# Patient Record
Sex: MALE
[De-identification: names, ages, dates, MRNs, and addresses within clinical notes are randomized per-mention and may not be internally consistent; named-entity substitution may affect disease eponyms.]

---

## 2019-07-24 ENCOUNTER — APPOINTMENT (OUTPATIENT)
Dept: GASTROENTEROLOGY | Facility: CLINIC | Age: 69
End: 2019-07-24
Payer: MEDICARE

## 2019-07-24 VITALS
WEIGHT: 216 LBS | OXYGEN SATURATION: 98 % | BODY MASS INDEX: 25.5 KG/M2 | HEART RATE: 79 BPM | RESPIRATION RATE: 14 BRPM | SYSTOLIC BLOOD PRESSURE: 124 MMHG | DIASTOLIC BLOOD PRESSURE: 76 MMHG | HEIGHT: 77 IN

## 2019-07-24 DIAGNOSIS — Z78.9 OTHER SPECIFIED HEALTH STATUS: ICD-10-CM

## 2019-07-24 DIAGNOSIS — Z86.69 PERSONAL HISTORY OF OTHER DISEASES OF THE NERVOUS SYSTEM AND SENSE ORGANS: ICD-10-CM

## 2019-07-24 DIAGNOSIS — Z12.11 ENCOUNTER FOR SCREENING FOR MALIGNANT NEOPLASM OF COLON: ICD-10-CM

## 2019-07-24 DIAGNOSIS — Z87.39 PERSONAL HISTORY OF OTHER DISEASES OF THE MUSCULOSKELETAL SYSTEM AND CONNECTIVE TISSUE: ICD-10-CM

## 2019-07-24 DIAGNOSIS — Z82.49 FAMILY HISTORY OF ISCHEMIC HEART DISEASE AND OTHER DISEASES OF THE CIRCULATORY SYSTEM: ICD-10-CM

## 2019-07-24 DIAGNOSIS — Z12.12 ENCOUNTER FOR SCREENING FOR MALIGNANT NEOPLASM OF COLON: ICD-10-CM

## 2019-07-24 PROCEDURE — 99214 OFFICE O/P EST MOD 30 MIN: CPT

## 2019-07-24 RX ORDER — ACETAZOLAMIDE 125 MG/1
125 TABLET ORAL
Refills: 0 | Status: ACTIVE | COMMUNITY

## 2019-07-24 NOTE — PHYSICAL EXAM
[General Appearance - Alert] : alert [General Appearance - In No Acute Distress] : in no acute distress [Sclera] : the sclera and conjunctiva were normal [Extraocular Movements] : extraocular movements were intact [Outer Ear] : the ears and nose were normal in appearance [Hearing Threshold Finger Rub Not Moore] : hearing was normal [Neck Appearance] : the appearance of the neck was normal [Neck Cervical Mass (___cm)] : no neck mass was observed [Auscultation Breath Sounds / Voice Sounds] : lungs were clear to auscultation bilaterally [Heart Rate And Rhythm] : heart rate was normal and rhythm regular [Heart Sounds] : normal S1 and S2 [Heart Sounds Gallop] : no gallops [Murmurs] : no murmurs [Heart Sounds Pericardial Friction Rub] : no pericardial rub [Bowel Sounds] : normal bowel sounds [Abdomen Soft] : soft [Abdomen Tenderness] : non-tender [Abdomen Mass (___ Cm)] : no abdominal mass palpated [Cervical Lymph Nodes Enlarged Posterior Bilaterally] : posterior cervical [Cervical Lymph Nodes Enlarged Anterior Bilaterally] : anterior cervical [Supraclavicular Lymph Nodes Enlarged Bilaterally] : supraclavicular [Abnormal Walk] : normal gait [] : no rash [Skin Color & Pigmentation] : normal skin color and pigmentation [Nail Clubbing] : no clubbing  or cyanosis of the fingernails [Affect] : the affect was normal [Impaired Insight] : insight and judgment were intact [Oriented To Time, Place, And Person] : oriented to person, place, and time

## 2019-07-27 NOTE — HISTORY OF PRESENT ILLNESS
[de-identified] : 68 y/o man without any major medical problems who is referred for a screening colonoscopy. \par \par Pt denies having abdominal pain, heartburn, dysphagia, odynophagia, nausea, vomiting, fevers, chills, jaundice, loss of appetite, wt loss, constipation, diarrhea, hematochezia and melena.\par \par Years ago had slight acid reflux - but sleeping on wedge he no longer experiencing symptoms.  \par \par 12 years ago had a colonoscopy and was normal.  In 2014 he had a colonoscopy but had a poor prep and recommended to have a repeat colonoscopy in 1 to 2 years but did not.  He says he wasn't clean during exam because he didn't have enough time to follow all the instructions for prior colonoscopy.\par \par Patient denies family hx of GI cancers.\par \par All other review of systems are negative.  Denies cardiac symptoms.\par

## 2019-07-27 NOTE — ASSESSMENT
[FreeTextEntry1] : 70 y/o man without any major medical problems who is referred for a screening colonoscopy. \par \par I had a long discuss with the patient regarding colon cancer in the [place].    \par \par We reviewed risk factors for colon cancer which include age, ethnicity, having comorbidities such as obesity, DM, cardiac disease, having nicotine addiction, alcohol addiction, having a family hx of colon cancer, cancer syndromes, personal hx of inflammatory disease etc.  \par \par Patient is average risk for colon cancer.  \par \par We reviewed the screening tests for colon cancer prevention.  We discussed screening tests such as stool test, CT scans such as CT colonography, and a colonoscopy.    Patient was made aware that despite these screening test, a cancerous lesion can still be missed.  The gold standard test is a screening colonoscopy.\par \par I will therefore plan for a colonoscopy to rule out colon polyps, colorectal cancer etc. under monitored anesthesia care.  Risks such as perforation requiring surgery, bleeding, infection, colitis, missed lesion, internal organ injury, etc, risks of bowel prep including colitis, syncope etc. and risks of anesthesia including cardiopulmonary compromise were discussed with patient.  Patient verbalized understanding and agrees to proceed with the planned procedure.\par \par \par MiraLAX once a day.

## 2020-07-23 ENCOUNTER — APPOINTMENT (OUTPATIENT)
Dept: ORTHOPEDIC SURGERY | Facility: CLINIC | Age: 70
End: 2020-07-23
Payer: MEDICARE

## 2020-07-23 VITALS
SYSTOLIC BLOOD PRESSURE: 135 MMHG | HEIGHT: 77 IN | WEIGHT: 214 LBS | HEART RATE: 47 BPM | BODY MASS INDEX: 25.27 KG/M2 | DIASTOLIC BLOOD PRESSURE: 78 MMHG

## 2020-07-23 DIAGNOSIS — M25.561 PAIN IN RIGHT KNEE: ICD-10-CM

## 2020-07-23 PROCEDURE — 20610 DRAIN/INJ JOINT/BURSA W/O US: CPT | Mod: RT

## 2020-07-23 PROCEDURE — 99203 OFFICE O/P NEW LOW 30 MIN: CPT | Mod: 25

## 2020-07-23 PROCEDURE — 73564 X-RAY EXAM KNEE 4 OR MORE: CPT | Mod: RT

## 2020-07-23 RX ORDER — SODIUM SULFATE, POTASSIUM SULFATE, MAGNESIUM SULFATE 17.5; 3.13; 1.6 G/ML; G/ML; G/ML
17.5-3.13-1.6 SOLUTION, CONCENTRATE ORAL
Qty: 1 | Refills: 0 | Status: DISCONTINUED | COMMUNITY
Start: 2019-07-27 | End: 2020-07-23

## 2020-07-23 RX ORDER — NICOTINE POLACRILEX 2 MG
GUM BUCCAL
Refills: 0 | Status: DISCONTINUED | COMMUNITY
End: 2020-07-23

## 2020-07-23 NOTE — PHYSICAL EXAM
[de-identified] : The patient appears well nourished  and in no apparent distress.  The patient is alert and oriented to person, place, and time.   Affect and mood appear normal. The head is normocephalic and atraumatic.  The eyes reveal normal sclera and extra ocular muscles are intact. The tongue is midline with no apparent lesions.  Skin shows normal turgor with no evidence of eczema or psoriasis.  No respiratory distress noted.  Sensation grossly intact.\par   [de-identified] : Xray- 4 views of the right knee shows moderate patellofemoral compartment arthritis of the right knee. [de-identified] : Exam of the right knee shows a minimal effusion, full extension, patellofemoral crepitus, flexion of 140 degrees. 5/5 motor strength bilaterally distally. Sensation intact distally.

## 2020-07-23 NOTE — CONSULT LETTER
[Dear  ___] : Dear  [unfilled], [Consult Letter:] : I had the pleasure of evaluating your patient, [unfilled]. [Consult Closing:] : Thank you very much for allowing me to participate in the care of this patient.  If you have any questions, please do not hesitate to contact me. [Please see my note below.] : Please see my note below. [Sincerely,] : Sincerely, [FreeTextEntry2] : TYLER HILL MD\par  [FreeTextEntry3] : Jose Huynh MD\par Chief of Joint Replacement\par [facility] \par [facility]\par \par

## 2020-07-23 NOTE — DISCUSSION/SUMMARY
[de-identified] : The patient is a 70 year old male with moderate patellofemoral compartment arthritis of the right knee. Conservative options were discussed. He began Visco Supplementation today. He was previously prescribed Meloxicam by another physician and was recommended to take this during flares of pain. He will follow up next week for his second injection of Visco Supplementation.

## 2020-07-23 NOTE — PROCEDURE
[de-identified] : Allergies: The patient denies allergies to medications and has no allergies to chicken,eggs, or feathers.\par Procedure: The patient has been identified by name and date of birth. Patient confirms that we are treating the right knee today.\par The right knee was prepped in the usual sterile fashion. The area was cleansed with chlorhexadine, then sprayed with ethyl chloride. The patient was then injected with the Euflexxa into the right knee. The patient tolerated the procedure well. The medication was delivered aseptically and atraumatically.\par Diagnosis: Osteoarthritis of the right knee \par Treatment: The patient was advised on the activities for today. I gave the patient instructions on postinjection ice and analgesia.\par

## 2020-07-23 NOTE — ADDENDUM
[FreeTextEntry1] : This note was authored by Karson Bloom working as a medical scribe for Dr. Jose Huynh. The note was reviewed, edited, and revised by Dr. Jose Huynh whom is in agreement with the exam findings, imaging findings, and treatment plan. 07/23/2020.

## 2020-07-23 NOTE — HISTORY OF PRESENT ILLNESS
[de-identified] : Patient is a 70-year-old male presenting for evaluation of 3-week history of right knee pain.  His right knee pain is generalized anteriorly and posteriorly.  He notes his pain is worse with deep flexion and loading of the knee, such as squatting down, rising from a seated position, or going up and down stairs.  He notes some intermittent swelling.  He denies any stiffness.  He denies any injury, trauma, or anything out of the ordinary that led up to this pain but states the pain began in the beginning of July.  He has not had injections to the knee.  Patient has not had physical therapy.  He takes Mobic which helps to mildly reduce his symptoms but does not completely make him pain-free.  He presents today for evaluation.

## 2020-07-31 ENCOUNTER — APPOINTMENT (OUTPATIENT)
Dept: ORTHOPEDIC SURGERY | Facility: CLINIC | Age: 70
End: 2020-07-31

## 2020-08-06 ENCOUNTER — APPOINTMENT (OUTPATIENT)
Dept: ORTHOPEDIC SURGERY | Facility: CLINIC | Age: 70
End: 2020-08-06
Payer: MEDICARE

## 2020-08-06 VITALS — WEIGHT: 214 LBS | BODY MASS INDEX: 25.27 KG/M2 | HEIGHT: 77 IN

## 2020-08-06 PROCEDURE — 20610 DRAIN/INJ JOINT/BURSA W/O US: CPT | Mod: RT

## 2020-08-06 NOTE — HISTORY OF PRESENT ILLNESS
[de-identified] : The patient is here today for a Euflexxa injection for the Right knee. The patient is having osteoarthritic symptoms. The patient was seen previously and was indicated for Euflexxa injections.\par Allergies: The patient denies allergies to medications and has no allergies to chicken,eggs, or feathers.\par Procedure: The patient has been identified by name and date of birth. Patient confirms that we are treating the right knee today.\par The right knee was prepped in the usual sterile fashion. The area was cleansed with chlorhexadine, then sprayed with ethyl chloride. The patient was then injected with the Euflexxa into the right knee. The patient tolerated the procedure well. The medication was delivered aseptically and atraumatically.\par Diagnosis: Osteoarthritis of the right knee \par Treatment: The patient was advised on the activities for today. I gave the patient instructions on postinjection ice and analgesia.\par  Follow up recommended in one week.

## 2020-08-06 NOTE — REASON FOR VISIT
[Follow-Up Visit] : a follow-up visit for [Other: ____] : [unfilled] [FreeTextEntry2] : right knee Euflexxa injection #2. Lot# [id number], Expires on 2020/09/13

## 2020-08-14 ENCOUNTER — APPOINTMENT (OUTPATIENT)
Dept: ORTHOPEDIC SURGERY | Facility: CLINIC | Age: 70
End: 2020-08-14
Payer: MEDICARE

## 2020-08-14 PROCEDURE — 20610 DRAIN/INJ JOINT/BURSA W/O US: CPT | Mod: RT

## 2020-08-14 NOTE — HISTORY OF PRESENT ILLNESS
[de-identified] : The patient is here today for the third Euflexxa injection for the Right knee. The patient is having osteoarthritic symptoms. The patient was seen previously and was indicated for Euflexxa injections.\par Allergies: The patient denies allergies to medications and has no allergies to chicken,eggs, or feathers.\par Procedure: The patient has been identified by name and date of birth. Patient confirms that we are treating the right knee today.\par The right knee was prepped in the usual sterile fashion. The area was cleansed with chlorhexadine, then sprayed with ethyl chloride. The patient was then injected with the Euflexxa into the right knee. The patient tolerated the procedure well. The medication was delivered aseptically and atraumatically.\par Diagnosis: Osteoarthritis of the right knee \par Treatment: The patient was advised on the activities for today. I gave the patient instructions on postinjection ice and analgesia.\par  Follow up recommended in 6 weeks

## 2020-08-14 NOTE — REASON FOR VISIT
[Follow-Up Visit] : a follow-up visit for [Other: ____] : [unfilled] [FreeTextEntry2] : right knee Euflexxa injection #3. Lot# [id number], Expires on 2020/09/13. \par

## 2020-09-08 ENCOUNTER — RECORD ABSTRACTING (OUTPATIENT)
Age: 70
End: 2020-09-08

## 2020-09-09 ENCOUNTER — APPOINTMENT (OUTPATIENT)
Dept: OTOLARYNGOLOGY | Facility: CLINIC | Age: 70
End: 2020-09-09
Payer: MEDICARE

## 2020-09-09 VITALS
TEMPERATURE: 94.2 F | SYSTOLIC BLOOD PRESSURE: 143 MMHG | HEIGHT: 76 IN | DIASTOLIC BLOOD PRESSURE: 87 MMHG | BODY MASS INDEX: 25.82 KG/M2 | WEIGHT: 212 LBS

## 2020-09-09 DIAGNOSIS — Z80.8 FAMILY HISTORY OF MALIGNANT NEOPLASM OF OTHER ORGANS OR SYSTEMS: ICD-10-CM

## 2020-09-09 DIAGNOSIS — H40.9 UNSPECIFIED GLAUCOMA: ICD-10-CM

## 2020-09-09 DIAGNOSIS — Z82.2 FAMILY HISTORY OF DEAFNESS AND HEARING LOSS: ICD-10-CM

## 2020-09-09 PROCEDURE — 92567 TYMPANOMETRY: CPT

## 2020-09-09 PROCEDURE — 92557 COMPREHENSIVE HEARING TEST: CPT

## 2020-09-09 PROCEDURE — 99213 OFFICE O/P EST LOW 20 MIN: CPT

## 2020-09-09 RX ORDER — TRAVOPROST 0.04 MG/ML
0 SOLUTION/ DROPS OPHTHALMIC
Refills: 0 | Status: ACTIVE | COMMUNITY

## 2020-09-09 RX ORDER — NETARSUDIL 0.2 MG/ML
0.02 SOLUTION/ DROPS OPHTHALMIC; TOPICAL
Refills: 0 | Status: ACTIVE | COMMUNITY

## 2020-09-09 RX ORDER — PILOCARPINE HYDROCHLORIDE OPHTHALMIC SOLUTION 10 MG/ML
1 SOLUTION/ DROPS OPHTHALMIC
Refills: 0 | Status: ACTIVE | COMMUNITY

## 2020-09-09 NOTE — ADDENDUM
[FreeTextEntry1] : Documented by Mile Jung acting as scribe for Dr. Carvajal on 09/09/2020.\par \par All Medical record entries made by the Scribe were at my, Dr. Carvajal, direction and personally dictated by me on 09/09/2020 . I have reviewed the chart and agree that the record accurately reflects my personal performance of the history, physical exam, assessment and plan. I have also personally directed, reviewed, and agreed with the discharge instructions.

## 2020-09-09 NOTE — ASSESSMENT
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 Audio. Keep the left ear dry. FU 3 months.

## 2020-09-09 NOTE — CONSULT LETTER
[Dear  ___] : Dear  [unfilled], [Courtesy Letter:] : I had the pleasure of seeing your patient, [unfilled], in my office today. [Consult Closing:] : Thank you very much for allowing me to participate in the care of this patient.  If you have any questions, please do not hesitate to contact me. [Sincerely,] : Sincerely, [Please see my note below.] : Please see my note below. [FreeTextEntry3] : Juni Carvajal MD FACS

## 2020-09-09 NOTE — PHYSICAL EXAM
[Clear / Open well] : hypopharynx is clear and opens well [Midline] : trachea is located in midline position [Normal] : no rashes [Hearing Loss Right Only] : normal [Hearing Loss Left Only] : normal [de-identified] : tube in place [FreeTextEntry1] : s/p uvulectomy.

## 2020-09-09 NOTE — HISTORY OF PRESENT ILLNESS
[de-identified] : The patient presents s/p Left Myringotomy With Tiny T-Tube on 5/26/2020. Pt reports doing well.

## 2020-10-09 ENCOUNTER — APPOINTMENT (OUTPATIENT)
Dept: ORTHOPEDIC SURGERY | Facility: CLINIC | Age: 70
End: 2020-10-09
Payer: MEDICARE

## 2020-10-09 VITALS
SYSTOLIC BLOOD PRESSURE: 132 MMHG | BODY MASS INDEX: 25.82 KG/M2 | HEART RATE: 54 BPM | WEIGHT: 212 LBS | DIASTOLIC BLOOD PRESSURE: 80 MMHG | HEIGHT: 76 IN

## 2020-10-09 DIAGNOSIS — M17.11 UNILATERAL PRIMARY OSTEOARTHRITIS, RIGHT KNEE: ICD-10-CM

## 2020-10-09 PROCEDURE — 99213 OFFICE O/P EST LOW 20 MIN: CPT

## 2020-10-09 NOTE — DISCUSSION/SUMMARY
[de-identified] : The patient is a 70 year old male with moderate patellofemoral compartment arthritis of the right knee. Conservative options were discussed. He continues to have 80% relief of his symptoms from his previous Visco Supplementation series. He was given a prescription for physical therapy. We discussed the use of over-the-counter anti-inflammatories as well as activity modification and ice as needed. He may follow up as needed.

## 2020-10-09 NOTE — HISTORY OF PRESENT ILLNESS
[de-identified] : Mr. MIRANDA BRANCH is a 70 year old male presenting for follow up evaluation of the right knee.  Patient recently completed the Euflexxa series to the right knee the last injection being August 14, 2020.  He states he felt great relief from these injections.  He still has some mild intermittent pain to the medial anterior aspect.  He continues with home exercise but has not had physical therapy recently.  Patient takes ibuprofen as needed for pain.  Overall he is happy with his results but is wondering presenting what he can be doing.

## 2020-10-09 NOTE — PHYSICAL EXAM
[de-identified] : The patient appears well nourished  and in no apparent distress.  The patient is alert and oriented to person, place, and time.   Affect and mood appear normal. The head is normocephalic and atraumatic.  The eyes reveal normal sclera and extra ocular muscles are intact. The tongue is midline with no apparent lesions.  Skin shows normal turgor with no evidence of eczema or psoriasis.  No respiratory distress noted.  Sensation grossly intact.\par   [de-identified] : Exam of the right knee shows a minimal effusion, full extension, patellofemoral crepitus, flexion of 140 degrees. 5/5 motor strength bilaterally distally. Sensation intact distally.

## 2020-10-09 NOTE — ADDENDUM
[FreeTextEntry1] : This note was authored by Karson Bloom working as a medical scribe for Dr. Jose Huynh. The note was reviewed, edited, and revised by Dr. Jose Huynh whom is in agreement with the exam findings, imaging findings, and treatment plan. 10/09/2020.

## 2020-12-09 ENCOUNTER — APPOINTMENT (OUTPATIENT)
Dept: OTOLARYNGOLOGY | Facility: CLINIC | Age: 70
End: 2020-12-09
Payer: MEDICARE

## 2020-12-09 VITALS
HEIGHT: 76 IN | BODY MASS INDEX: 26.3 KG/M2 | HEART RATE: 52 BPM | WEIGHT: 216 LBS | SYSTOLIC BLOOD PRESSURE: 127 MMHG | TEMPERATURE: 97 F | DIASTOLIC BLOOD PRESSURE: 70 MMHG

## 2020-12-09 DIAGNOSIS — R22.1 LOCALIZED SWELLING, MASS AND LUMP, HEAD: ICD-10-CM

## 2020-12-09 DIAGNOSIS — R22.0 LOCALIZED SWELLING, MASS AND LUMP, HEAD: ICD-10-CM

## 2020-12-09 PROCEDURE — 99213 OFFICE O/P EST LOW 20 MIN: CPT

## 2020-12-09 NOTE — CONSULT LETTER
[Dear  ___] : Dear  [unfilled], [Courtesy Letter:] : I had the pleasure of seeing your patient, [unfilled], in my office today. [Please see my note below.] : Please see my note below. [Consult Closing:] : Thank you very much for allowing me to participate in the care of this patient.  If you have any questions, please do not hesitate to contact me. [Sincerely,] : Sincerely, [FreeTextEntry3] : Juni Carvajal MD FACS

## 2020-12-09 NOTE — HISTORY OF PRESENT ILLNESS
[de-identified] : The patient presents s/p Left Myringotomy With Tiny T-Tube on 5/26/2020. Pt reports doing well.

## 2020-12-09 NOTE — PHYSICAL EXAM
[Normal] : no rashes [de-identified] : skin lesion right side [de-identified] : tube in place, tympanosclerosis around the periphery with a clear center, tube in the center [FreeTextEntry1] : s/p uvulectomy

## 2020-12-09 NOTE — ADDENDUM
[FreeTextEntry1] : Documented by Mile Jung acting as scribe for Dr. Carvajal on 12/09/2020.\par \par All Medical record entries made by the Scribe were at my, Dr. Carvajal, direction and personally dictated by me on 12/09/2020 . I have reviewed the chart and agree that the record accurately reflects my personal performance of the history, physical exam, assessment and plan. I have also personally directed, reviewed, and agreed with the discharge instructions.

## 2020-12-09 NOTE — ASSESSMENT
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 FU with dermatologist regarding right neck skin lesion. Keep the left ear dry. FU 3-4 months.

## 2020-12-23 PROBLEM — Z12.11 ENCOUNTER FOR COLORECTAL CANCER SCREENING: Status: RESOLVED | Noted: 2019-07-27 | Resolved: 2020-12-23

## 2021-04-05 ENCOUNTER — APPOINTMENT (OUTPATIENT)
Dept: OTOLARYNGOLOGY | Facility: CLINIC | Age: 71
End: 2021-04-05

## 2021-04-30 ENCOUNTER — APPOINTMENT (OUTPATIENT)
Dept: OTOLARYNGOLOGY | Facility: CLINIC | Age: 71
End: 2021-04-30
Payer: MEDICARE

## 2021-04-30 VITALS
TEMPERATURE: 97.2 F | HEIGHT: 76 IN | DIASTOLIC BLOOD PRESSURE: 71 MMHG | SYSTOLIC BLOOD PRESSURE: 109 MMHG | BODY MASS INDEX: 26.06 KG/M2 | HEART RATE: 48 BPM | WEIGHT: 214 LBS

## 2021-04-30 DIAGNOSIS — R09.82 POSTNASAL DRIP: ICD-10-CM

## 2021-04-30 PROCEDURE — 99213 OFFICE O/P EST LOW 20 MIN: CPT

## 2021-04-30 PROCEDURE — 92557 COMPREHENSIVE HEARING TEST: CPT

## 2021-04-30 PROCEDURE — 92567 TYMPANOMETRY: CPT

## 2021-04-30 RX ORDER — TURMERIC 400 MG
400 CAPSULE ORAL
Refills: 0 | Status: ACTIVE | COMMUNITY
Start: 2021-04-30

## 2021-04-30 NOTE — END OF VISIT
[FreeTextEntry3] : Documented by Genoveva Perera acting as scribe for Dr. Carvajal on 04/30/2021 .\par \par All Medical record entries made by the Scribe were at my, Dr. Carvajal, direction and personally dictated by me on 04/30/2021. I have reviewed the chart and agree that the record accurately reflects my personal performance of the history, physical exam, assessment and plan. I have also personally directed, reviewed, and agreed with the discharge instructions.

## 2021-04-30 NOTE — PHYSICAL EXAM
[Midline] : trachea located in midline position [Normal] : salivary glands are normal [FreeTextEntry8] : Old healed perforation [FreeTextEntry9] : Tube is wide open and clear, debris around the base of the tube [FreeTextEntry5] : Salvador's test demonstrates no lateralization of tone [FreeTextEntry1] : Midline septum, mildly inflammed turbinates [FreeTextEntry2] : sinus nontender to percussion

## 2021-04-30 NOTE — ASSESSMENT
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 Carvajal and reviewed with the patient.\par FU 3 months\par

## 2021-04-30 NOTE — HISTORY OF PRESENT ILLNESS
[de-identified] : Patient presents with a h/o a Left Myringotomy With Tiny T-Tube on 5/26/2020. He states he feels his hearing is the same, but states that his wife feels his hearing might be deteriorating.  The right skin lesion on his neck from the last visit on 12/09/20 was examined by his dermatologist and has since resolved.\par

## 2021-06-25 ENCOUNTER — APPOINTMENT (OUTPATIENT)
Dept: ORTHOPEDIC SURGERY | Facility: CLINIC | Age: 71
End: 2021-06-25

## 2021-09-15 ENCOUNTER — APPOINTMENT (OUTPATIENT)
Dept: OTOLARYNGOLOGY | Facility: CLINIC | Age: 71
End: 2021-09-15
Payer: MEDICARE

## 2021-09-15 VITALS
DIASTOLIC BLOOD PRESSURE: 68 MMHG | HEART RATE: 49 BPM | BODY MASS INDEX: 26.06 KG/M2 | SYSTOLIC BLOOD PRESSURE: 116 MMHG | HEIGHT: 76 IN | WEIGHT: 214 LBS

## 2021-09-15 DIAGNOSIS — J30.1 ALLERGIC RHINITIS DUE TO POLLEN: ICD-10-CM

## 2021-09-15 DIAGNOSIS — T16.2XXA FOREIGN BODY IN LEFT EAR, INITIAL ENCOUNTER: ICD-10-CM

## 2021-09-15 PROCEDURE — 99213 OFFICE O/P EST LOW 20 MIN: CPT | Mod: 25

## 2021-09-15 PROCEDURE — 69200 CLEAR OUTER EAR CANAL: CPT

## 2021-09-16 PROBLEM — T16.2XXA FOREIGN BODY IN LEFT EAR: Status: ACTIVE | Noted: 2021-09-15

## 2021-09-16 PROBLEM — J30.1 ALLERGIC RHINITIS DUE TO POLLEN: Status: ACTIVE | Noted: 2021-04-30

## 2021-09-16 NOTE — PROCEDURE
[FreeTextEntry3] : removed by aligatir forceps [Risk and Benefits Discussed] : The purpose, risks, discomforts, benefits and alternatives of the procedure have been explained to the patient including no treatment. [Sudden Hearing Loss] : Sudden Hearing Loss [Same] : same as the Pre Op Dx. [] : Removal of FB: Ear Canal [FreeTextEntry1] : tiny t tube inserted over 1 year ago left ear, now feeling clogged. Removal indicated as old tube with cerumen was causing a hearing loss [FreeTextEntry2] : 2 weeks [FreeTextEntry4] : none [FreeTextEntry6] : tm perforation behind tube and cerumen, superior posterior.

## 2021-09-16 NOTE — PHYSICAL EXAM
[] : congested on the right side [Normal] : mucosa is normal [Midline] : trachea located in midline position [de-identified] : salivary gland fullness, left more then right [Hearing Loss Left Only] : diminished [FreeTextEntry6] : clear [FreeTextEntry7] : tube removed with with curette, cerumen removed withh tube and from around tube.  [de-identified] : perforation of the tympanic membrane, posterior superior [de-identified] : post up 3 [FreeTextEntry1] : inflamed turbinates

## 2021-09-16 NOTE — ASSESSMENT
[FreeTextEntry1] : Patient presents with a h/o a Left Myringotomy With Tiny T-Tube on 5/26/2020. Keeping left ear dry.  Feels wax or infection in left ear because feeling fullness for past two weeks.Other than recent fullness in left ear , overall no change in hearing since last visit. Last audio 4/30/21 AD WNL sloping to mild SNHL. AS WNL sloping to moderate hearing loss. which was unchanged from audio in 9/2020.\par \par Old tube removed left ear with residual perforation. Perf acts as a tube and we will reevaluate after tm closes. fu 2 months. keep left ear dry.

## 2021-09-16 NOTE — HISTORY OF PRESENT ILLNESS
[de-identified] : Patient presents with a h/o a Left Myringotomy With Tiny T-Tube on 5/26/2020. Keeping left ear dry.  Feels wax or infection in left ear because feeling fullness for past two weeks.Other than recent fullness in left ear , overall no change in hearing since last visit. Last audio 4/30/21 AD WNL sloping to mild SNHL. AS WNL sloping to moderate hearing loss. which was unchanged from audio in 9/2020. functional tube left ear on tymp.

## 2021-11-10 ENCOUNTER — APPOINTMENT (OUTPATIENT)
Dept: OTOLARYNGOLOGY | Facility: CLINIC | Age: 71
End: 2021-11-10
Payer: MEDICARE

## 2021-11-10 VITALS
DIASTOLIC BLOOD PRESSURE: 76 MMHG | SYSTOLIC BLOOD PRESSURE: 129 MMHG | HEIGHT: 76 IN | HEART RATE: 48 BPM | BODY MASS INDEX: 26.06 KG/M2 | WEIGHT: 214 LBS

## 2021-11-10 PROCEDURE — 99213 OFFICE O/P EST LOW 20 MIN: CPT

## 2021-11-10 NOTE — HISTORY OF PRESENT ILLNESS
[de-identified] : Paul Lott is a 70 yo male with hx of left ear tube s/p removal at last visit with Dr. Carvajal with residual perforation. Since the tube was removed, he notes that his left ear has felt somewhat full. He denies otalgia or otorrhea. He believes the hearing is stable since last visit. He denies vertigo but has had longstanding tinnitus. He notes some nasal congestion, but no rhinorrhea and postnasal drainage. He denies fevers or chills.

## 2021-11-10 NOTE — ASSESSMENT
[FreeTextEntry1] : Paul Lott presents for follow-up of his left tympanic membrane perforation. His perforation persists at this time. No evidence of infection or drainage. We will continue to monitor to see if his tympanic membrane heals spontaneously.\par \par - Follow up in 3 months for reevaluation and audiogram.

## 2021-11-10 NOTE — PHYSICAL EXAM
[de-identified] : Right TM intact with some tympanosclerosis. Left posterior TM perforation, about 40%, dry, no drainage or sign of infection. [Midline] : trachea located in midline position [Normal] : no rashes

## 2021-12-01 ENCOUNTER — APPOINTMENT (OUTPATIENT)
Dept: OTOLARYNGOLOGY | Facility: CLINIC | Age: 71
End: 2021-12-01
Payer: MEDICARE

## 2021-12-01 VITALS
DIASTOLIC BLOOD PRESSURE: 76 MMHG | HEART RATE: 45 BPM | BODY MASS INDEX: 26.06 KG/M2 | HEIGHT: 76 IN | WEIGHT: 214 LBS | SYSTOLIC BLOOD PRESSURE: 132 MMHG

## 2021-12-01 PROCEDURE — 99214 OFFICE O/P EST MOD 30 MIN: CPT

## 2021-12-01 PROCEDURE — 92567 TYMPANOMETRY: CPT

## 2021-12-01 PROCEDURE — 92557 COMPREHENSIVE HEARING TEST: CPT

## 2021-12-01 NOTE — PHYSICAL EXAM
[Normal] : mucosa is normal [Midline] : trachea located in midline position [Removed] : palatine tonsils previously removed [de-identified] : right submandibular gland enlarged but soft [Hearing Loss Right Only] : normal [Hearing Loss Left Only] : normal [de-identified] : hypervascularity [de-identified] : TM perforation, no fluid [de-identified] : post UP3

## 2021-12-01 NOTE — HISTORY OF PRESENT ILLNESS
[de-identified] : Paul Lott is a 72 yo male with hx of left ear tube s/p removal on 9/15/21 with residual perforation. Saw Dr Garcia on 11/10/21 and perforation was closing but not completely closed. Since the tube was removed, he notes that his left ear has felt somewhat full. He denies otalgia or otorrhea. HEaring is poorer on thr left as well.

## 2021-12-01 NOTE — ASSESSMENT
[FreeTextEntry1] : Reviewed and reconciled meds, allergies, pmhx famhx, sochx\par \par Patient presents with a h/o a Left Myringotomy With Tiny T-Tube on 5/26/2020. \par Tube removed 9/15/21 with residual perf on physical exam as of today\par Left ear fullness and worse hearing loss as compared to right\par \par Plan\par Audio reviewed and interpreted with patient by Dr. Carvajal significant conductive component. recommending tympanoplasty  risk is that pt will need another tube in future. \par Discussed risks and benefits of Left tympanoplasty with Dr. Goodwin

## 2021-12-16 ENCOUNTER — APPOINTMENT (OUTPATIENT)
Dept: OTOLARYNGOLOGY | Facility: CLINIC | Age: 71
End: 2021-12-16
Payer: MEDICARE

## 2021-12-16 VITALS
DIASTOLIC BLOOD PRESSURE: 75 MMHG | BODY MASS INDEX: 36.54 KG/M2 | HEIGHT: 64 IN | WEIGHT: 214 LBS | HEART RATE: 43 BPM | SYSTOLIC BLOOD PRESSURE: 142 MMHG

## 2021-12-16 DIAGNOSIS — H93.8X2 OTHER SPECIFIED DISORDERS OF LEFT EAR: ICD-10-CM

## 2021-12-16 PROCEDURE — 99213 OFFICE O/P EST LOW 20 MIN: CPT | Mod: 25

## 2021-12-16 PROCEDURE — 92504 EAR MICROSCOPY EXAMINATION: CPT

## 2021-12-16 NOTE — REASON FOR VISIT
[Initial Consultation] : an initial consultation for [FreeTextEntry2] : tympanic membrane perforation

## 2021-12-16 NOTE — HISTORY OF PRESENT ILLNESS
[de-identified] : 70 y/o M patient of Dr. Carvajal presents for initial evaluation of hearing loss\par Reports hearing loss in left ear since September\par T tube in left ear that was removed by Dr. Carvajal in September\par History of chronic ear infections and ear tube placement

## 2021-12-16 NOTE — DATA REVIEWED
[de-identified] : I personally reviewed and interpreted the patient's audiogram for the patient's abnormal auditory perception, which shows\par Tymps: Type A AD; Large ECV AS\par AD: Hearing -2000Hz; mild to mod SNHL 3kHz-8kHz\par AS: Moderate rising to mild mixed -1500Hz, mod/mod-severe mixed HL 2000-8000Hz

## 2021-12-16 NOTE — PHYSICAL EXAM
[Normal] : the left external ear was normal [de-identified] : tympanosclerosis; no sign of infection [de-identified] : posterior perforation involving 15-20% of ear drum; surrounding tympanosclerosis; cannot clearly visualize IS joint; no sign of infection

## 2022-02-09 ENCOUNTER — APPOINTMENT (OUTPATIENT)
Dept: OTOLARYNGOLOGY | Facility: CLINIC | Age: 72
End: 2022-02-09

## 2022-03-14 ENCOUNTER — APPOINTMENT (OUTPATIENT)
Dept: OTOLARYNGOLOGY | Facility: CLINIC | Age: 72
End: 2022-03-14
Payer: MEDICARE

## 2022-03-14 VITALS
SYSTOLIC BLOOD PRESSURE: 124 MMHG | HEIGHT: 76 IN | BODY MASS INDEX: 26.06 KG/M2 | DIASTOLIC BLOOD PRESSURE: 53 MMHG | WEIGHT: 214 LBS | HEART RATE: 48 BPM

## 2022-03-14 PROCEDURE — 99214 OFFICE O/P EST MOD 30 MIN: CPT | Mod: 25

## 2022-03-14 PROCEDURE — 92504 EAR MICROSCOPY EXAMINATION: CPT

## 2022-03-14 NOTE — PROCEDURE
[FreeTextEntry3] : Procedure note:  Binocular microscopy\par \par Mar 14, 2022 \par \par Inspection of the ears was performed under binocular microscopy because of need to accurately visualize otologic landmarks and potential pathologic conditions that would not otherwise be visible through simple otoscopic exam.\par

## 2022-05-05 ENCOUNTER — APPOINTMENT (OUTPATIENT)
Dept: PHARMACY | Facility: CLINIC | Age: 72
End: 2022-05-05
Payer: SELF-PAY

## 2022-05-05 PROCEDURE — V5010 ASSESSMENT FOR HEARING AID: CPT | Mod: NC

## 2022-05-06 ENCOUNTER — APPOINTMENT (OUTPATIENT)
Dept: PHARMACY | Facility: CLINIC | Age: 72
End: 2022-05-06
Payer: SELF-PAY

## 2022-05-06 PROCEDURE — V5010 ASSESSMENT FOR HEARING AID: CPT | Mod: LT

## 2022-06-10 ENCOUNTER — APPOINTMENT (OUTPATIENT)
Dept: PHARMACY | Facility: CLINIC | Age: 72
End: 2022-06-10
Payer: SELF-PAY

## 2022-06-10 PROCEDURE — V5257F: CUSTOM | Mod: LT

## 2022-06-23 ENCOUNTER — APPOINTMENT (OUTPATIENT)
Dept: PHARMACY | Facility: CLINIC | Age: 72
End: 2022-06-23
Payer: SELF-PAY

## 2022-06-23 PROCEDURE — V5299A: CUSTOM | Mod: NC

## 2022-07-21 ENCOUNTER — APPOINTMENT (OUTPATIENT)
Dept: PHARMACY | Facility: CLINIC | Age: 72
End: 2022-07-21

## 2022-07-21 PROCEDURE — V5299A: CUSTOM | Mod: NC

## 2022-12-07 ENCOUNTER — APPOINTMENT (OUTPATIENT)
Dept: OTOLARYNGOLOGY | Facility: CLINIC | Age: 72
End: 2022-12-07

## 2022-12-07 ENCOUNTER — NON-APPOINTMENT (OUTPATIENT)
Age: 72
End: 2022-12-07

## 2022-12-07 VITALS
SYSTOLIC BLOOD PRESSURE: 117 MMHG | BODY MASS INDEX: 26.06 KG/M2 | DIASTOLIC BLOOD PRESSURE: 56 MMHG | HEART RATE: 48 BPM | WEIGHT: 214 LBS | HEIGHT: 76 IN

## 2022-12-07 PROCEDURE — 92557 COMPREHENSIVE HEARING TEST: CPT

## 2022-12-07 PROCEDURE — 99213 OFFICE O/P EST LOW 20 MIN: CPT

## 2022-12-07 PROCEDURE — 92567 TYMPANOMETRY: CPT

## 2022-12-07 NOTE — ASSESSMENT
[FreeTextEntry1] : Reviewed and reconciled medications, allergies, PMHx, PSHx, SocHx, FMHx \par \par Patient with hx of left ear tube s/p removal on 9/15/21 with residual perforation presents today stating that he got a new hearing aid that seems to be working well. \par \par Physical Exam:\par -Right Ear: cerumen removed via curettage \par -Left Ear: cerumen removed via curettage. Perf middle of ear drum.\par -s/p UP3\par \par Audio: hearing unchanged.\par -TYMPS: TYPE A AD, LARGE ECV AS\par -AD: ESSENTIALLY -2000 HZ, MILD TO MOD SNHL [id number] HZ\par -AS: MODERATE RISING TO MILD -1500 HZ, SLOPING BACK TO A MOD / MOD-SEVERE MHL [id number] HZ  \par \par Plan: cerumen removed bilaterally. Audio - results viewed by Dr. Carvajal. JUSTINE 6 months

## 2022-12-07 NOTE — HISTORY OF PRESENT ILLNESS
[de-identified] : Patient with hx of left ear tube s/p removal on 9/15/21 with residual perforation presents today stating that he got a new hearing aid that seems to be working well.

## 2022-12-07 NOTE — CONSULT LETTER
[Dear  ___] : Dear  [unfilled], [Courtesy Letter:] : I had the pleasure of seeing your patient, [unfilled], in my office today. [Please see my note below.] : Please see my note below. [Consult Closing:] : Thank you very much for allowing me to participate in the care of this patient.  If you have any questions, please do not hesitate to contact me. [Sincerely,] : Sincerely, [FreeTextEntry1] : Juni Carvajal MD FACS

## 2022-12-07 NOTE — PHYSICAL EXAM
[Midline] : trachea located in midline position [Normal] : no rashes [Hearing Loss Right Only] : normal [Hearing Loss Left Only] : normal [FreeTextEntry8] : cerumen removed via curettage  [FreeTextEntry9] : cerumen removed via curettage. Perf middle of ear drum [de-identified] : s/p UP3

## 2022-12-07 NOTE — DATA REVIEWED
[de-identified] : \par -TYMPS: TYPE A AD, LARGE ECV AS\par -AD: ESSENTIALLY -2000 HZ, MILD TO MOD SNHL [id number] HZ\par -AS: MODERATE RISING TO MILD -1500 HZ, SLOPING BACK TO A MOD / MOD-SEVERE MHL [id number] HZ

## 2023-09-29 ENCOUNTER — APPOINTMENT (OUTPATIENT)
Dept: PHARMACY | Facility: CLINIC | Age: 73
End: 2023-09-29

## 2023-09-29 ENCOUNTER — APPOINTMENT (OUTPATIENT)
Dept: OTOLARYNGOLOGY | Facility: CLINIC | Age: 73
End: 2023-09-29

## 2023-10-25 ENCOUNTER — APPOINTMENT (OUTPATIENT)
Dept: OTOLARYNGOLOGY | Facility: CLINIC | Age: 73
End: 2023-10-25
Payer: MEDICARE

## 2023-10-25 ENCOUNTER — APPOINTMENT (OUTPATIENT)
Dept: PHARMACY | Facility: CLINIC | Age: 73
End: 2023-10-25
Payer: SELF-PAY

## 2023-10-25 VITALS
BODY MASS INDEX: 26.18 KG/M2 | WEIGHT: 215 LBS | HEART RATE: 45 BPM | DIASTOLIC BLOOD PRESSURE: 80 MMHG | SYSTOLIC BLOOD PRESSURE: 125 MMHG | HEIGHT: 76 IN

## 2023-10-25 DIAGNOSIS — K11.20 SIALOADENITIS, UNSPECIFIED: ICD-10-CM

## 2023-10-25 PROCEDURE — V5299A: CUSTOM

## 2023-10-25 PROCEDURE — 99213 OFFICE O/P EST LOW 20 MIN: CPT

## 2024-05-16 ENCOUNTER — APPOINTMENT (OUTPATIENT)
Dept: OTOLARYNGOLOGY | Facility: CLINIC | Age: 74
End: 2024-05-16

## 2024-06-11 ENCOUNTER — APPOINTMENT (OUTPATIENT)
Dept: OTOLARYNGOLOGY | Facility: CLINIC | Age: 74
End: 2024-06-11
Payer: MEDICARE

## 2024-06-11 DIAGNOSIS — Z86.69 PERSONAL HISTORY OF OTHER DISEASES OF THE NERVOUS SYSTEM AND SENSE ORGANS: ICD-10-CM

## 2024-06-11 DIAGNOSIS — Z87.09 PERSONAL HISTORY OF OTHER DISEASES OF THE RESPIRATORY SYSTEM: ICD-10-CM

## 2024-06-11 DIAGNOSIS — H90.3 SENSORINEURAL HEARING LOSS, BILATERAL: ICD-10-CM

## 2024-06-11 DIAGNOSIS — Z78.9 OTHER SPECIFIED HEALTH STATUS: ICD-10-CM

## 2024-06-11 DIAGNOSIS — J31.0 CHRONIC RHINITIS: ICD-10-CM

## 2024-06-11 DIAGNOSIS — Z85.9 PERSONAL HISTORY OF MALIGNANT NEOPLASM, UNSPECIFIED: ICD-10-CM

## 2024-06-11 DIAGNOSIS — G47.33 OBSTRUCTIVE SLEEP APNEA (ADULT) (PEDIATRIC): ICD-10-CM

## 2024-06-11 DIAGNOSIS — H72.92 UNSPECIFIED PERFORATION OF TYMPANIC MEMBRANE, LEFT EAR: ICD-10-CM

## 2024-06-11 DIAGNOSIS — H66.90 OTITIS MEDIA, UNSPECIFIED, UNSPECIFIED EAR: ICD-10-CM

## 2024-06-11 DIAGNOSIS — H93.13 TINNITUS, BILATERAL: ICD-10-CM

## 2024-06-11 PROCEDURE — 92557 COMPREHENSIVE HEARING TEST: CPT

## 2024-06-11 PROCEDURE — 99213 OFFICE O/P EST LOW 20 MIN: CPT

## 2024-06-11 PROCEDURE — 92567 TYMPANOMETRY: CPT

## 2024-06-11 RX ORDER — TRAVOPROST 0.04 MG/ML
0 SOLUTION OPHTHALMIC
Refills: 0 | Status: ACTIVE | COMMUNITY

## 2024-06-11 RX ORDER — NETARSUDIL 0.2 MG/ML
0.02 SOLUTION/ DROPS OPHTHALMIC; TOPICAL
Refills: 0 | Status: ACTIVE | COMMUNITY

## 2024-06-11 RX ORDER — APIXABAN 5 MG/1
5 TABLET, FILM COATED ORAL
Refills: 0 | Status: ACTIVE | COMMUNITY

## 2024-06-11 RX ORDER — BRIMONIDINE TARTRATE, TIMOLOL MALEATE 2; 5 MG/ML; MG/ML
0.2-0.5 SOLUTION/ DROPS OPHTHALMIC
Refills: 0 | Status: ACTIVE | COMMUNITY

## 2024-06-11 RX ORDER — PILOCARPINE HYDROCHLORIDE OPHTHALMIC SOLUTION 20 MG/ML
2 SOLUTION/ DROPS OPHTHALMIC
Refills: 0 | Status: ACTIVE | COMMUNITY

## 2024-06-11 NOTE — ASSESSMENT
[FreeTextEntry1] : He has a history of a left tympanogram perforation following myringotomy with tube insertion for eustachian tube dysfunction.  He has been doing well.  There is no infection on exam today.  We reviewed his repeat audiogram which showed a bilateral hearing loss with mild conductive asymmetry in the left ear.  He has a history of sleep apnea in the past.  He said that his wife noticed that he has been waking up snorting.  It is possible he could be gasping for air.   Plan -Findings and management options were discussed with the patient. - Good ear hygiene and dry ear precautions - Monitor hearing - He will follow-up with his audiologist to have his hearing aid check.  He may consider hearing aid for his right ear - We discussed obtaining a repeat sleep study.  He would like to proceed.  We will arrange a home sleep study to ensure there is no sleep apnea - I will see him back after the testing - He should call and return earlier if he has any problems or concerns

## 2024-06-11 NOTE — CONSULT LETTER
[Dear  ___] : Dear  [unfilled], [Consult Letter:] : I had the pleasure of evaluating your patient, [unfilled]. [Please see my note below.] : Please see my note below. [Consult Closing:] : Thank you very much for allowing me to participate in the care of this patient.  If you have any questions, please do not hesitate to contact me. [Sincerely,] : Sincerely, [FreeTextEntry3] : Deysi Blue MD [facility] at [facility] Otolaryngology  Head & Neck Surgery [facility]   Department of Otolaryngology [facility] [place]  Office Tel: [phone]

## 2024-06-11 NOTE — PHYSICAL EXAM
[TextEntry] : PHYSICAL EXAM  General: The patient was alert, oriented and in no distress. Voice was clear.  Face: The patient had no facial asymmetry or mass. The skin was unremarkable.  Ears: Hearing normal to conversational voice External ears were normal without deformity. Ear canals were clear. No cerumen or inflammation Tympanic membranes: Right ear-tympanic membrane intact with scarring.  Left ear-small to medium size tympanic membrane perforation.  No inflammation or drainage.  Nose:  The external nose had no significant deformity.   . On anterior rhinoscopy, the nasal mucosa was clear.  The anterior septum was grossly midline. There were no visualized polyps, purulence  or masses.   Oral cavity: Oral mucosa- normal. Oral and base of tongue- clear and without mass. Gingival and buccal mucosa- moist and without lesions. Palate- the palate moved well. There was no cleft palate. There appeared to be good salivary flow.   Oral cavity/oropharynx- no pus, erythema or mass Status post uvulectomy  Neck:  The neck was symmetrical. The parotid and submandibular glands were normal without masses. The trachea was midline and there was no unusual crepitus. Thyroid was smooth and nontender and no masses were palpated. No masses  Lymphatics: Cervical adenopathy- none.

## 2024-06-11 NOTE — HISTORY OF PRESENT ILLNESS
[de-identified] : MIRANDA BRANCH is a 74 year old patient With a long history of eustachian tube dysfunction and hearing loss here for evaluation.  He is well-known to Dr. Rod kathleen.  He has since moved to [place] and is establishing care here.  He has no otalgia, otorrhea, or dizziness.  He does have mild tinnitus.  He has a history of a left tympanic membrane perforation.  He uses a hearing aid in the left ear.  He also had mild sleep apnea in the past.  His wife has noticed that he wakes up with his snoring and possibly gasping for air.  He does not have significant daytime fatigue.  He has had recurrent ear infections since he was 13 years old In 2003, he had bilateral myringotomy with tube insertion.  The right tube extruded and he never had a problem again in that ear. He has had multiple myringotomy tubes in the left ear.  The last one was removed in 2021.  He has a residual perforation. He has mild seasonal allergies He had septoplasty in the past He had uvulectomy in 2003 for cyst

## 2024-06-12 PROBLEM — H90.3 SENSORINEURAL HEARING LOSS (SNHL) OF BOTH EARS: Status: ACTIVE | Noted: 2021-04-30

## 2024-07-23 ENCOUNTER — APPOINTMENT (OUTPATIENT)
Dept: SLEEP CENTER | Facility: HOME HEALTH | Age: 74
End: 2024-07-23

## 2024-09-23 ENCOUNTER — APPOINTMENT (OUTPATIENT)
Dept: OTOLARYNGOLOGY | Facility: CLINIC | Age: 74
End: 2024-09-23
Payer: SELF-PAY

## 2024-09-23 PROCEDURE — V5010 ASSESSMENT FOR HEARING AID: CPT | Mod: NC

## 2024-09-30 ENCOUNTER — APPOINTMENT (OUTPATIENT)
Dept: OTOLARYNGOLOGY | Facility: CLINIC | Age: 74
End: 2024-09-30

## 2024-10-14 ENCOUNTER — APPOINTMENT (OUTPATIENT)
Dept: OTOLARYNGOLOGY | Facility: CLINIC | Age: 74
End: 2024-10-14
Payer: SELF-PAY

## 2024-10-14 PROCEDURE — V5257F: CUSTOM | Mod: RT

## 2024-11-04 ENCOUNTER — APPOINTMENT (OUTPATIENT)
Dept: OTOLARYNGOLOGY | Facility: CLINIC | Age: 74
End: 2024-11-04
Payer: SELF-PAY

## 2024-11-04 PROCEDURE — 92593: CPT | Mod: NC

## 2025-03-17 ENCOUNTER — APPOINTMENT (OUTPATIENT)
Dept: OTOLARYNGOLOGY | Facility: CLINIC | Age: 75
End: 2025-03-17
Payer: SELF-PAY

## 2025-03-17 PROCEDURE — 92593: CPT | Mod: NC
